# Patient Record
Sex: FEMALE | Race: ASIAN | NOT HISPANIC OR LATINO | URBAN - METROPOLITAN AREA
[De-identification: names, ages, dates, MRNs, and addresses within clinical notes are randomized per-mention and may not be internally consistent; named-entity substitution may affect disease eponyms.]

---

## 2024-09-02 ENCOUNTER — INPATIENT (INPATIENT)
Facility: HOSPITAL | Age: 49
LOS: 0 days | Discharge: HOME | DRG: 254 | End: 2024-09-03
Attending: STUDENT IN AN ORGANIZED HEALTH CARE EDUCATION/TRAINING PROGRAM | Admitting: STUDENT IN AN ORGANIZED HEALTH CARE EDUCATION/TRAINING PROGRAM
Payer: COMMERCIAL

## 2024-09-02 VITALS
OXYGEN SATURATION: 97 % | TEMPERATURE: 97 F | DIASTOLIC BLOOD PRESSURE: 77 MMHG | WEIGHT: 179.9 LBS | RESPIRATION RATE: 20 BRPM | HEIGHT: 65 IN | HEART RATE: 95 BPM | SYSTOLIC BLOOD PRESSURE: 145 MMHG

## 2024-09-02 DIAGNOSIS — T18.108A UNSPECIFIED FOREIGN BODY IN ESOPHAGUS CAUSING OTHER INJURY, INITIAL ENCOUNTER: ICD-10-CM

## 2024-09-02 LAB
ALBUMIN SERPL ELPH-MCNC: 4.2 G/DL — SIGNIFICANT CHANGE UP (ref 3.5–5.2)
ALP SERPL-CCNC: 75 U/L — SIGNIFICANT CHANGE UP (ref 30–115)
ALT FLD-CCNC: 28 U/L — SIGNIFICANT CHANGE UP (ref 0–41)
ANION GAP SERPL CALC-SCNC: 11 MMOL/L — SIGNIFICANT CHANGE UP (ref 7–14)
APTT BLD: 41.1 SEC — HIGH (ref 27–39.2)
AST SERPL-CCNC: 18 U/L — SIGNIFICANT CHANGE UP (ref 0–41)
BILIRUB SERPL-MCNC: <0.2 MG/DL — SIGNIFICANT CHANGE UP (ref 0.2–1.2)
BUN SERPL-MCNC: 18 MG/DL — SIGNIFICANT CHANGE UP (ref 10–20)
CALCIUM SERPL-MCNC: 9.5 MG/DL — SIGNIFICANT CHANGE UP (ref 8.4–10.5)
CHLORIDE SERPL-SCNC: 103 MMOL/L — SIGNIFICANT CHANGE UP (ref 98–110)
CO2 SERPL-SCNC: 26 MMOL/L — SIGNIFICANT CHANGE UP (ref 17–32)
CREAT SERPL-MCNC: 0.7 MG/DL — SIGNIFICANT CHANGE UP (ref 0.7–1.5)
EGFR: 105 ML/MIN/1.73M2 — SIGNIFICANT CHANGE UP
GLUCOSE SERPL-MCNC: 122 MG/DL — HIGH (ref 70–99)
HCT VFR BLD CALC: 39.5 % — SIGNIFICANT CHANGE UP (ref 37–47)
HGB BLD-MCNC: 12.4 G/DL — SIGNIFICANT CHANGE UP (ref 12–16)
INR BLD: 0.94 RATIO — SIGNIFICANT CHANGE UP (ref 0.65–1.3)
MCHC RBC-ENTMCNC: 21.5 PG — LOW (ref 27–31)
MCHC RBC-ENTMCNC: 31.4 G/DL — LOW (ref 32–37)
MCV RBC AUTO: 68.6 FL — LOW (ref 81–99)
NRBC # BLD: 0 /100 WBCS — SIGNIFICANT CHANGE UP (ref 0–0)
PLATELET # BLD AUTO: 316 K/UL — SIGNIFICANT CHANGE UP (ref 130–400)
PMV BLD: 9.9 FL — SIGNIFICANT CHANGE UP (ref 7.4–10.4)
POTASSIUM SERPL-MCNC: 4 MMOL/L — SIGNIFICANT CHANGE UP (ref 3.5–5)
POTASSIUM SERPL-SCNC: 4 MMOL/L — SIGNIFICANT CHANGE UP (ref 3.5–5)
PROT SERPL-MCNC: 7.4 G/DL — SIGNIFICANT CHANGE UP (ref 6–8)
PROTHROM AB SERPL-ACNC: 10.7 SEC — SIGNIFICANT CHANGE UP (ref 9.95–12.87)
RBC # BLD: 5.76 M/UL — HIGH (ref 4.2–5.4)
RBC # FLD: 15.3 % — HIGH (ref 11.5–14.5)
SODIUM SERPL-SCNC: 140 MMOL/L — SIGNIFICANT CHANGE UP (ref 135–146)
WBC # BLD: 7.69 K/UL — SIGNIFICANT CHANGE UP (ref 4.8–10.8)
WBC # FLD AUTO: 7.69 K/UL — SIGNIFICANT CHANGE UP (ref 4.8–10.8)

## 2024-09-02 PROCEDURE — C9399: CPT

## 2024-09-02 PROCEDURE — 71046 X-RAY EXAM CHEST 2 VIEWS: CPT | Mod: 26

## 2024-09-02 PROCEDURE — 70360 X-RAY EXAM OF NECK: CPT | Mod: 26

## 2024-09-02 PROCEDURE — 88300 SURGICAL PATH GROSS: CPT

## 2024-09-02 PROCEDURE — 99285 EMERGENCY DEPT VISIT HI MDM: CPT

## 2024-09-02 PROCEDURE — 36415 COLL VENOUS BLD VENIPUNCTURE: CPT

## 2024-09-02 PROCEDURE — 84703 CHORIONIC GONADOTROPIN ASSAY: CPT

## 2024-09-02 RX ORDER — SODIUM CHLORIDE 9 MG/ML
1000 INJECTION INTRAMUSCULAR; INTRAVENOUS; SUBCUTANEOUS
Refills: 0 | Status: DISCONTINUED | OUTPATIENT
Start: 2024-09-02 | End: 2024-09-03

## 2024-09-02 NOTE — ED PROVIDER NOTE - PROGRESS NOTE DETAILS
Dr. Jerrica Nick, DO: Patient arrived from [facility]. ENT notified of arrival. Dr. Flor:49-year-old female presents emergency department for duck bone stuck in esophagus.  Patient admitted to ENT for further evaluation management.

## 2024-09-02 NOTE — ED ADULT NURSE REASSESSMENT NOTE - NS ED NURSE REASSESS COMMENT FT1
Pt. assessed. Pt. alert and responsive to tactile and verbal stimuli, oriented to person time place and situation. Pt. s/p bedside bronchoscopy which was tolerated well. Vital sign monitoring continues.

## 2024-09-02 NOTE — H&P ADULT - NSHPLABSRESULTS_GEN_ALL_CORE
12.4   7.69  )-----------( 316      ( 02 Sep 2024 19:30 )             39.5     09-02    140  |  103  |  18  ----------------------------<  122<H>  4.0   |  26  |  0.7    Ca    9.5      02 Sep 2024 19:30    TPro  7.4  /  Alb  4.2  /  TBili  <0.2  /  DBili  x   /  AST  18  /  ALT  28  /  AlkPhos  75  09-02    PT/INR - ( 02 Sep 2024 19:30 )   PT: 10.70 sec;   INR: 0.94 ratio         PTT - ( 02 Sep 2024 19:30 )  PTT:41.1 sec    < from: Xray Neck Soft Tissue (09.02.24 @ 19:54) >      ACC: [id number] EXAM:  XR NECK SOFT TISSUE   ORDERED BY: ANJU LOPEZ     PROCEDURE DATE:  09/02/2024          INTERPRETATION:  NECK SOFT TISSUE    HISTORY: Foreign body ingestion - duck bone.    COMPARISON: No comparison study is available at thetime of this   dictation.    TECHNIQUE: AP and lateral views of the neck for evaluation of the soft   tissues is available for interpretation.    FINDINGS:    The adenoids are normal. The palatine tonsils are nonenlarged. There is   normal caliber ofthe nasopharyngeal airway. The retropharynx,   epiglottis, aryepiglottic folds, subglottic trachea, and bones are normal.    Anterior to the C7 vertebral body, there is a radiopaque foreign body in   the cervical esophagus.    IMPRESSION:    Radiopaque foreign body visualized in the cervical esophagus, anterior to   the C7 vertebral body.      Preliminary findings were discussed with LOIDA LOPEZ at 9/2/2024   8:22 PM.    --- End of Report ---          SKY MEJIA MD; Resident Radiologist  This document has been electronically signed.  JUVENAL SANDOVAL MD; Attending Radiologist  This document has been electronically signed. Sep  2 2024  8:50PM    < end of copied text >

## 2024-09-02 NOTE — ED PROVIDER NOTE - ATTENDING APP SHARED VISIT CONTRIBUTION OF CARE
I reviewed and verified the documentation and  and independently performed the documented      49-year-old female no past medical history presents with foreign body sensation to throat that occurred while eating duck prior to ED arrival.  Patient felt something sharp when she swallowed.  Patient well-appearing no distress no airway involvement pharynx clear neck and chest no crepitus nontoxic-appearing    X-ray with positive bone anterior to C7.  ENT consulted plan for transfer [place] for removal around 2 AM as patient ate prior to ED arrival.

## 2024-09-02 NOTE — H&P ADULT - HISTORY OF PRESENT ILLNESS
50 y/o female with no significant pMHx presenting with FB sensation to lower anterior neck. Pt states she felt a sharp pain when she swallowed and has felt the FB sensation since then around 6pm today. Denies any fevers, chills, N/V. Pt appears well not in any distress. Denies any SOB/difficulty breathing. Able to swallow secretions with discomfort.

## 2024-09-02 NOTE — ED PROVIDER NOTE - PHYSICAL EXAMINATION
Physical Exam    Vital Signs: I have reviewed the initial vital signs.  Constitutional: well-nourished, appears stated age, no acute distress  Eyes: Conjunctiva pink, Sclera clear, PERRLA, EOMI.  Throat: NO visiable FB or abrasions   Cardiovascular: S1 and S2, regular rate, regular rhythm, well-perfused extremities, radial pulses equal and 2+  Respiratory: unlabored respiratory effort, clear to auscultation bilaterally no wheezing, rales and rhonchi  Gastrointestinal: soft, non-tender abdomen, no pulsatile mass, normal bowl sounds  Musculoskeletal: supple neck, no lower extremity edema, no midline tenderness  Integumentary: warm, dry, no rash  Neurologic: awake, alert, cranial nerves II-XII grossly intact, extremities’ motor and sensory functions grossly intact  Psychiatric: appropriate mood, appropriate affect

## 2024-09-02 NOTE — ED PROVIDER NOTE - OBJECTIVE STATEMENT
49-year-old female no past medical history presents with foreign body sensation to throat that occurred while eating duck prior to ED arrival.  Patient felt something sharp when she swallowed.

## 2024-09-02 NOTE — ED ADULT NURSE NOTE - NSFALLUNIVINTERV_ED_ALL_ED
Bed/Stretcher in lowest position, wheels locked, appropriate side rails in place/Call bell, personal items and telephone in reach/Instruct patient to call for assistance before getting out of bed/chair/stretcher/Non-slip footwear applied when patient is off stretcher/[place] to call system/Physically safe environment - no spills, clutter or unnecessary equipment/Purposeful proactive rounding/Room/bathroom lighting operational, light cord in reach

## 2024-09-02 NOTE — H&P ADULT - NSHPPHYSICALEXAM_GEN_ALL_CORE
GEN: NAD, awake and alert. No drooling or pooling of secretions. No stridor or stertor. Good vocal quality, no hoarseness.   SKIN: Good color, non diaphoretic.  HEENT: Oral mucosa pink and moist. No erythema or edema noted to buccal mucosa, tongue, FOM, uvula or posterior oropharynx. Uvula midline.   NECK: Trachea midline, Neck supple, no TTP to B/L lateral neck, no cervical LAD.  RESP: No dyspnea, non-labored breathing. No use of accessory muscles.   CARDIO: +S1/S2  ABDO: Soft, NT.  EXT: WILSON x 4    Fiberoptic Laryngoscopy: No masses or lesions noted to NP/OP/HP. Laryngeal structures intact, no edema or erythema noted. Epiglottis crisp, no edema. TVC/FVC mobile and intact, no glottic gap noted. Airway patent.

## 2024-09-02 NOTE — H&P ADULT - NS ATTEND AMEND GEN_ALL_CORE FT
I have reviewed the clinical history and examined the patient at bedside in the preoperative area.   History and neck xray consistent with duck bone in cervical esophagus.   Recommend direct laryngoscopy and rigid esophagoscopy with removal of foreign body.   Risks, benefits and alternatives discussed and questions answered to patient's satisfaction.  Risks include but not limited to: bleeding, infections, damage to lips, teeth, tongue, lining of esophagus, need for nasogastric tube placement for enteral nutrition, esophageal perforation, further surgeries.  Benefits include removal of foreign body and evaluation of esophagus. Alternatives include watchful waiting and serial xray to assess for spontaneous passage of foreign body into stomach, which is unlikely. Patient and I agree that moving forward with procedure is in her best interest and informed consent was signed.

## 2024-09-02 NOTE — H&P ADULT - ASSESSMENT
50 y/o female with FB in the cervical esophagus, anterior to C7  - NPO  - IV fluids  - Pain control  - On add on for esophagoscopy FB removal today.  - Discussed with Dr. Dominguez

## 2024-09-02 NOTE — ED ADULT NURSE REASSESSMENT NOTE - NS ED NURSE REASSESS COMMENT FT1
Patient transferred from [facility].  A/O x 4. No s/s of acute distress or pain at this time. Pt safety and comfort maintained.  97.-79-20  126/79  98% on RA

## 2024-09-03 VITALS
DIASTOLIC BLOOD PRESSURE: 70 MMHG | OXYGEN SATURATION: 95 % | TEMPERATURE: 98 F | RESPIRATION RATE: 18 BRPM | SYSTOLIC BLOOD PRESSURE: 121 MMHG | HEART RATE: 100 BPM

## 2024-09-03 LAB — HCG SERPL QL: NEGATIVE — SIGNIFICANT CHANGE UP

## 2024-09-03 PROCEDURE — 43194 ESOPHAGOSCP RIG TRNSO REM FB: CPT

## 2024-09-03 PROCEDURE — 88300 SURGICAL PATH GROSS: CPT | Mod: 26

## 2024-09-03 RX ORDER — ONDANSETRON 2 MG/ML
4 INJECTION, SOLUTION INTRAMUSCULAR; INTRAVENOUS ONCE
Refills: 0 | Status: DISCONTINUED | OUTPATIENT
Start: 2024-09-03 | End: 2024-09-03

## 2024-09-03 NOTE — DISCHARGE NOTE PROVIDER - NSDCCPTREATMENT_GEN_ALL_CORE_FT
PRINCIPAL PROCEDURE  Procedure: Esophagoscopy, rigid, with foreign body removal  Findings and Treatment:       SECONDARY PROCEDURE  Procedure: Laryngoscopy, direct, adult  Findings and Treatment:

## 2024-09-03 NOTE — DISCHARGE NOTE PROVIDER - CARE PROVIDER_API CALL
Gigi Dominguez  Otolaryngology  [address] [place] [id number]  Phone: [phone]  Fax: [phone]  Established Patient  Follow Up Time: 2 weeks

## 2024-09-03 NOTE — DISCHARGE NOTE PROVIDER - CARE PROVIDERS DIRECT ADDRESSES
[email or web address]

## 2024-09-03 NOTE — BRIEF OPERATIVE NOTE - OPERATION/FINDINGS
Triangular bone, 3cm in largest dimension in cervical esophagus.   Cervical esophagoscope and rat tooth grasper used to remove bone.   Minimal blood (5cc) seen from superficial mucosal abrasion.  No through and through injury seen.

## 2024-09-03 NOTE — DISCHARGE NOTE PROVIDER - NSDCCPCAREPLAN_GEN_ALL_CORE_FT
PRINCIPAL DISCHARGE DIAGNOSIS  Diagnosis: Esophageal foreign body  Assessment and Plan of Treatment:

## 2024-09-03 NOTE — PROGRESS NOTE ADULT - SUBJECTIVE AND OBJECTIVE BOX
ENT DAILY PROGRESS NOTE    48 y/o female with no significant pMHx presenting with FB sensation s/p Esophagoscopy, rigid, with foreign body removal, POD#0. Patient seen and exmamined at bedside. Patient reports symptoms have improved. Denies any difficulty swallowing or pain with swallowing. Denies any fever, chills, N/V, neck pain, intolerance to own secretion/PO solids and liquids.       REVIEW OF SYSTEMS   [x] A ten-point review of systems was otherwise negative except as noted.      Allergies    No Known Allergies    Intolerances        MEDICATIONS:      Vital Signs Last 24 Hrs  T(C): 36.3 (03 Sep 2024 08:26), Max: 36.8 (03 Sep 2024 02:40)  T(F): 97.3 (03 Sep 2024 08:26), Max: 98.2 (03 Sep 2024 02:40)  HR: 72 (03 Sep 2024 08:26) (72 - 95)  BP: 105/64 (03 Sep 2024 08:26) (105/64 - 145/77)  BP(mean): 96 (03 Sep 2024 05:48) (96 - 96)  RR: 18 (03 Sep 2024 08:26) (15 - 20)  SpO2: 96% (03 Sep 2024 08:26) (96% - 100%)    Parameters below as of 03 Sep 2024 08:26  Patient On (Oxygen Delivery Method): room air          09-03 @ 07:01  -  09-03 @ 09:42  --------------------------------------------------------  IN:  Total IN: 0 mL    OUT:    Voided (mL): 300 mL  Total OUT: 300 mL    Total NET: -300 mL          PHYSICAL EXAM:    GEN: Well-developed, well-nourished. NAD, awake and alert. No drooling or pooling of secretions. No stridor or stertor. Good vocal quality, no hoarseness.   SKIN: Good color, non diaphoretic  HEENT: NC/AT; Oral mucosa pink and moist. No erythema or edema noted to buccal mucosa, tongue, FOM, uvula or posterior oropharynx. Uvula midline  NECK:  Trachea midline. Neck supple, no TTP to B/L lateral neck, no cervical LAD.  RESP: No dyspnea, non-labored breathing. No use of accessory muscles.  CARDIO: +S1/S2  ABDO: Soft, NT.  EXT: WILSON x 4    LABS:  CBC-                        12.4   7.69  )-----------( 316      ( 02 Sep 2024 19:30 )             39.5     BMP/CMP-  02 Sep 2024 19:30    140    |  103    |  18     ----------------------------<  122    4.0     |  26     |  0.7      Ca    9.5        02 Sep 2024 19:30    TPro  7.4    /  Alb  4.2    /  TBili  <0.2   /  DBili  x      /  AST  18     /  ALT  28     /  AlkPhos  75     02 Sep 2024 19:30    Coagulation Studies-  PT/INR - ( 02 Sep 2024 19:30 )   PT: 10.70 sec;   INR: 0.94 ratio         PTT - ( 02 Sep 2024 19:30 )  PTT:41.1 sec  Endocrine Panel-  Calcium: 9.5 mg/dL (09-02 @ 19:30)              RADIOLOGY & ADDITIONAL STUDIES:

## 2024-09-03 NOTE — DISCHARGE NOTE NURSING/CASE MANAGEMENT/SOCIAL WORK - PATIENT PORTAL LINK FT
You can access the FollowMyHealth Patient Portal offered by [facility] by registering at the following website: [email or web address]. By joining [facility]’s FollowMyHealth portal, you will also be able to view your health information using other applications (apps) compatible with our system.

## 2024-09-03 NOTE — BRIEF OPERATIVE NOTE - NSICDXBRIEFPROCEDURE_GEN_ALL_CORE_FT
PROCEDURES:  Laryngoscopy, direct, adult 03-Sep-2024 07:31:29  Gigi Dominguez  Esophagoscopy, rigid, with foreign body removal 03-Sep-2024 07:31:51  Gigi Dominguez

## 2024-09-03 NOTE — DISCHARGE NOTE PROVIDER - HOSPITAL COURSE
49 year old female with no significant pMHx presenting with FB sensation to lower anterior neck. Pt states she felt a sharp pain when she swallowed duck and has felt the FB sensation since then around 6pm. Patient was taken to OR for esophagoscopy, rigid, with foreign body removal, removal of Triangular bone, 3cm in largest dimension in cervical esophagus. Patient tolerated the procedure intra-op. She was taken to RR in stable condition. Patient continued to remain hemodynamically stable and was transferred to floor. She was advanced diet to clear liquid diet. Patient was stable for discharge on 9/ /24.    49 year old female with no significant pMHx presenting with FB sensation to lower anterior neck. Pt states she felt a sharp pain when she swallowed duck and has felt the FB sensation since then around 6pm. Patient was taken to OR for esophagoscopy, rigid, with foreign body removal, removal of Triangular bone, 3cm in largest dimension in cervical esophagus. Patient tolerated the procedure intra-op. She was taken to RR in stable condition. Patient continued to remain hemodynamically stable and was transferred to floor. She was advanced to soft diet and tolerated well. Patient was stable for discharge on 9/3/24.    49 year old female with no significant pMHx presenting with FB sensation to lower anterior neck. Pt states she felt a sharp pain when she swallowed duck and has felt the FB sensation since then around 6pm. Patient was taken to OR for esophagoscopy, rigid, with foreign body removal, removal of Triangular bone, 3cm in largest dimension in cervical esophagus.  She was advanced to soft diet and tolerated well. Patient was stable for discharge on 9/3/24.    49 year old female with no significant pMHx presenting with FB sensation to lower anterior neck. Pt states she felt a sharp pain when she swallowed duck and has felt the FB sensation since then around 6pm. Patient was taken to OR for esophagoscopy, rigid, with foreign body removal, removal of Triangular bone, 3cm in largest dimension in cervical esophagus.  She was advanced to soft diet and tolerated well. Patient was stable for discharge on 9/3/24, approved by Dr. Dominguez.

## 2024-09-03 NOTE — DISCHARGE NOTE PROVIDER - NSDCFUADDAPPT_GEN_ALL_CORE_FT
follow up in the office with Dr. Dominguez Follow up in the office with Dr. Dominguez. Please call the office to schedule appointment.

## 2024-09-03 NOTE — PROGRESS NOTE ADULT - ASSESSMENT
48 y/o female with no significant pMHx presenting with FB sensation s/p Esophagoscopy, rigid, with foreign body removal, POD#0, removed Triangular bone, 3cm in largest dimension in cervical esophagus.     Plan:   - Continue analgesics for pain control prn   - Encourage incentive spirometer   - DVT/GI ppx - ambulation and PPI  - Start CLD will advance to soft if  tolerated; if patient tolerated soft diet possible D/C home this afternoon   - d/w attending

## 2024-09-10 DIAGNOSIS — T18.128A FOOD IN ESOPHAGUS CAUSING OTHER INJURY, INITIAL ENCOUNTER: ICD-10-CM
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
## 2024-09-23 PROBLEM — Z78.9 OTHER SPECIFIED HEALTH STATUS: Chronic | Status: ACTIVE | Noted: 2024-09-10

## 2024-09-25 ENCOUNTER — APPOINTMENT (OUTPATIENT)
Dept: OTOLARYNGOLOGY | Facility: CLINIC | Age: 49
End: 2024-09-25
Payer: COMMERCIAL

## 2024-09-25 VITALS — HEIGHT: 65 IN | BODY MASS INDEX: 29.99 KG/M2 | WEIGHT: 180 LBS

## 2024-09-25 DIAGNOSIS — T18.108D UNSPECIFIED FOREIGN BODY IN ESOPHAGUS CAUSING OTHER INJURY, SUBSEQUENT ENCOUNTER: ICD-10-CM

## 2024-09-25 PROBLEM — Z00.00 ENCOUNTER FOR PREVENTIVE HEALTH EXAMINATION: Status: ACTIVE | Noted: 2024-09-25

## 2024-09-25 PROCEDURE — 99213 OFFICE O/P EST LOW 20 MIN: CPT
